# Patient Record
Sex: MALE | URBAN - METROPOLITAN AREA
[De-identification: names, ages, dates, MRNs, and addresses within clinical notes are randomized per-mention and may not be internally consistent; named-entity substitution may affect disease eponyms.]

---

## 2020-06-30 ENCOUNTER — APPOINTMENT (RX ONLY)
Dept: URBAN - METROPOLITAN AREA CLINIC 116 | Facility: CLINIC | Age: 60
Setting detail: DERMATOLOGY
End: 2020-06-30

## 2020-06-30 DIAGNOSIS — L28.1 PRURIGO NODULARIS: ICD-10-CM

## 2020-06-30 DIAGNOSIS — L29.89 OTHER PRURITUS: ICD-10-CM

## 2020-06-30 PROBLEM — L29.8 OTHER PRURITUS: Status: ACTIVE | Noted: 2020-06-30

## 2020-06-30 PROCEDURE — 17110 DESTRUCTION B9 LES UP TO 14: CPT

## 2020-06-30 PROCEDURE — ? LIQUID NITROGEN

## 2020-06-30 PROCEDURE — ? COUNSELING

## 2020-06-30 PROCEDURE — ? PRESCRIPTION

## 2020-06-30 PROCEDURE — 99202 OFFICE O/P NEW SF 15 MIN: CPT | Mod: 25

## 2020-06-30 RX ORDER — CLOBETASOL PROPIONATE 0.5 MG/G
1 OINTMENT TOPICAL TWICE A DAY
Qty: 1 | Refills: 1 | Status: ERX | COMMUNITY
Start: 2020-06-30

## 2020-06-30 RX ADMIN — CLOBETASOL PROPIONATE 1: 0.5 OINTMENT TOPICAL at 00:00

## 2020-06-30 ASSESSMENT — LOCATION ZONE DERM
LOCATION ZONE: TRUNK
LOCATION ZONE: ARM

## 2020-06-30 ASSESSMENT — LOCATION DETAILED DESCRIPTION DERM
LOCATION DETAILED: EPIGASTRIC SKIN
LOCATION DETAILED: RIGHT PROXIMAL POSTERIOR UPPER ARM
LOCATION DETAILED: RIGHT PROXIMAL LATERAL POSTERIOR UPPER ARM

## 2020-06-30 ASSESSMENT — LOCATION SIMPLE DESCRIPTION DERM
LOCATION SIMPLE: ABDOMEN
LOCATION SIMPLE: RIGHT UPPER ARM

## 2020-06-30 NOTE — PROCEDURE: MIPS QUALITY
Additional Notes: Pt is on no medications
Detail Level: Detailed
Quality 130: Documentation Of Current Medications In The Medical Record: Eligible clinician attests to documenting in the medical record the patient is not eligible for a current list of medications being obtained, updated, or reviewed by the eligible clinician

## 2020-06-30 NOTE — HPI: SKIN LESIONS
Have Your Skin Lesions Been Treated?: not been treated
Is This A New Presentation, Or A Follow-Up?: Skin Lesions
How Severe Is Your Skin Lesion?: mild
Additional History: Pt has used cortisone but it did not work that well.

## 2023-05-15 PROBLEM — 197321007: Status: ACTIVE | Noted: 2023-05-15

## 2023-05-17 ENCOUNTER — OFFICE VISIT (OUTPATIENT)
Dept: URBAN - METROPOLITAN AREA CLINIC 63 | Facility: CLINIC | Age: 63
End: 2023-05-17

## 2024-05-10 ENCOUNTER — DASHBOARD ENCOUNTERS (OUTPATIENT)
Age: 64
End: 2024-05-10

## 2024-05-13 ENCOUNTER — OFFICE VISIT (OUTPATIENT)
Dept: URBAN - METROPOLITAN AREA CLINIC 66 | Facility: CLINIC | Age: 64
End: 2024-05-13

## 2024-07-29 ENCOUNTER — OFFICE VISIT (OUTPATIENT)
Dept: URBAN - METROPOLITAN AREA CLINIC 66 | Facility: CLINIC | Age: 64
End: 2024-07-29
Payer: MEDICARE

## 2024-07-29 VITALS
HEIGHT: 75 IN | SYSTOLIC BLOOD PRESSURE: 140 MMHG | BODY MASS INDEX: 39.17 KG/M2 | DIASTOLIC BLOOD PRESSURE: 100 MMHG | WEIGHT: 315 LBS

## 2024-07-29 DIAGNOSIS — K59.00 ACUTE CONSTIPATION: ICD-10-CM

## 2024-07-29 DIAGNOSIS — I25.10 CORONARY ARTERY DISEASE INVOLVING NATIVE HEART, UNSPECIFIED VESSEL OR LESION TYPE, UNSPECIFIED WHETHER ANGINA PRESENT: ICD-10-CM

## 2024-07-29 DIAGNOSIS — R19.4 CHANGE IN BOWEL HABITS: ICD-10-CM

## 2024-07-29 DIAGNOSIS — Z79.01 CHRONIC ANTICOAGULATION: ICD-10-CM

## 2024-07-29 PROBLEM — 88111009: Status: ACTIVE | Noted: 2024-07-29

## 2024-07-29 PROBLEM — 53741008: Status: ACTIVE | Noted: 2024-07-29

## 2024-07-29 PROBLEM — 197119006: Status: ACTIVE | Noted: 2024-07-29

## 2024-07-29 PROBLEM — 711150003: Status: ACTIVE | Noted: 2024-07-29

## 2024-07-29 PROCEDURE — 99204 OFFICE O/P NEW MOD 45 MIN: CPT | Performed by: INTERNAL MEDICINE

## 2024-07-29 RX ORDER — CARVEDILOL 6.25 MG/1
TABLET, FILM COATED ORAL
Qty: 180 TABLET | Status: ACTIVE | COMMUNITY

## 2024-07-29 RX ORDER — APIXABAN 5 MG/1
TABLET, FILM COATED ORAL
Qty: 180 TABLET | Status: ACTIVE | COMMUNITY

## 2024-07-29 RX ORDER — TAMSULOSIN HYDROCHLORIDE 0.4 MG/1
TAKE 1 CAPSULE BY MOUTH ONCE DAILY AT BEDTIME CAPSULE ORAL
Qty: 90 EACH | Refills: 0 | Status: ACTIVE | COMMUNITY

## 2024-07-29 RX ORDER — FLUTICASONE PROPIONATE AND SALMETEROL 500; 50 UG/1; UG/1
INHALE 1 DOSE BY MOUTH TWICE DAILY AS DIRECTED POWDER RESPIRATORY (INHALATION)
Qty: 60 EACH | Refills: 3 | Status: ACTIVE | COMMUNITY

## 2024-07-29 RX ORDER — POTASSIUM CHLORIDE 750 MG/1
TABLET, FILM COATED, EXTENDED RELEASE ORAL
Qty: 90 TABLET | Status: ACTIVE | COMMUNITY

## 2024-07-29 RX ORDER — FINASTERIDE 5 MG/1
TABLET, FILM COATED ORAL
Qty: 90 TABLET | Status: ACTIVE | COMMUNITY

## 2024-07-29 RX ORDER — TORSEMIDE 20 MG/1
TABLET ORAL
Qty: 90 TABLET | Status: ACTIVE | COMMUNITY

## 2024-07-29 NOTE — HPI-TODAY'S VISIT:
Case of a 63-year-old male patient that comes in today for evaluation.  Patient refers that for the past several months he has experienced a change in bowel habits.  He refers worsening constipation.  He has to strain to have bowel movements.  Associated symptoms are abdominal distention.  Denies melena hematochezia hematemesis coffee-ground emesis.  He has never undergone a colonoscopy.

## 2024-09-23 ENCOUNTER — LAB OUTSIDE AN ENCOUNTER (OUTPATIENT)
Dept: URBAN - METROPOLITAN AREA CLINIC 66 | Facility: CLINIC | Age: 64
End: 2024-09-23

## 2024-09-23 ENCOUNTER — OFFICE VISIT (OUTPATIENT)
Dept: URBAN - METROPOLITAN AREA CLINIC 66 | Facility: CLINIC | Age: 64
End: 2024-09-23
Payer: MEDICARE

## 2024-09-23 VITALS
WEIGHT: 315 LBS | DIASTOLIC BLOOD PRESSURE: 90 MMHG | BODY MASS INDEX: 39.17 KG/M2 | SYSTOLIC BLOOD PRESSURE: 170 MMHG | HEART RATE: 78 BPM | OXYGEN SATURATION: 99 % | HEIGHT: 75 IN

## 2024-09-23 DIAGNOSIS — I25.10 CORONARY ARTERY DISEASE INVOLVING NATIVE HEART, UNSPECIFIED VESSEL OR LESION TYPE, UNSPECIFIED WHETHER ANGINA PRESENT: ICD-10-CM

## 2024-09-23 DIAGNOSIS — K59.00 ACUTE CONSTIPATION: ICD-10-CM

## 2024-09-23 DIAGNOSIS — Z79.01 CHRONIC ANTICOAGULATION: ICD-10-CM

## 2024-09-23 DIAGNOSIS — R19.4 CHANGE IN BOWEL HABITS: ICD-10-CM

## 2024-09-23 PROCEDURE — 99214 OFFICE O/P EST MOD 30 MIN: CPT | Performed by: INTERNAL MEDICINE

## 2024-09-23 RX ORDER — TORSEMIDE 20 MG/1
TABLET ORAL
Qty: 90 TABLET | Status: ACTIVE | COMMUNITY

## 2024-09-23 RX ORDER — FINASTERIDE 5 MG/1
TABLET, FILM COATED ORAL
Qty: 90 TABLET | Status: ACTIVE | COMMUNITY

## 2024-09-23 RX ORDER — FLUTICASONE PROPIONATE AND SALMETEROL 500; 50 UG/1; UG/1
INHALE 1 DOSE BY MOUTH TWICE DAILY AS DIRECTED POWDER RESPIRATORY (INHALATION)
Qty: 60 EACH | Refills: 3 | Status: ACTIVE | COMMUNITY

## 2024-09-23 RX ORDER — POTASSIUM CHLORIDE 750 MG/1
TABLET, FILM COATED, EXTENDED RELEASE ORAL
Qty: 90 TABLET | Status: ACTIVE | COMMUNITY

## 2024-09-23 RX ORDER — SOD SULF/POT CHLORIDE/MAG SULF 1.479 G
12 TABLETS TABLET ORAL
Qty: 24 | Refills: 0 | OUTPATIENT
Start: 2024-09-23 | End: 2024-09-24

## 2024-09-23 RX ORDER — APIXABAN 5 MG/1
TABLET, FILM COATED ORAL
Qty: 180 TABLET | Status: ACTIVE | COMMUNITY

## 2024-09-23 RX ORDER — TAMSULOSIN HYDROCHLORIDE 0.4 MG/1
TAKE 1 CAPSULE BY MOUTH ONCE DAILY AT BEDTIME CAPSULE ORAL
Qty: 90 EACH | Refills: 0 | Status: ACTIVE | COMMUNITY

## 2024-09-23 RX ORDER — CARVEDILOL 6.25 MG/1
TABLET, FILM COATED ORAL
Qty: 180 TABLET | Status: ACTIVE | COMMUNITY

## 2024-09-23 NOTE — HPI-TODAY'S VISIT:
Case of a 63-year-old male patient that comes in today for follow up of constipation.  Patient refers that for the past several months he has experienced a change in bowel habits.  He refers worsening constipation.  He has to strain to have bowel movements.  Associated symptoms are abdominal distention. On the last appointment he was started on miralax for symptom management. He was also referred to a cardiologist, due to his extensive cardiac Hx, to procure a cardiac risk stratification. He comes in today for follow up. Patient refers that he continues with bouts of constipation.  Currently managing with MiraLAX with limited response. He has seen cardiologist and cardiac restratification has been procured.   Denies melena hematochezia hematemesis coffee-ground emesis.

## 2024-09-30 ENCOUNTER — LAB OUTSIDE AN ENCOUNTER (OUTPATIENT)
Dept: URBAN - METROPOLITAN AREA CLINIC 66 | Facility: CLINIC | Age: 64
End: 2024-09-30

## 2024-10-14 ENCOUNTER — TELEPHONE ENCOUNTER (OUTPATIENT)
Dept: URBAN - METROPOLITAN AREA CLINIC 68 | Facility: CLINIC | Age: 64
End: 2024-10-14

## 2024-10-14 ENCOUNTER — OFFICE VISIT (OUTPATIENT)
Dept: URBAN - METROPOLITAN AREA MEDICAL CENTER 21 | Facility: MEDICAL CENTER | Age: 64
End: 2024-10-14
Payer: COMMERCIAL

## 2024-10-14 DIAGNOSIS — K64.8 OTHER HEMORRHOIDS: ICD-10-CM

## 2024-10-14 DIAGNOSIS — D12.4 BENIGN NEOPLASM OF DESCENDING COLON: ICD-10-CM

## 2024-10-14 DIAGNOSIS — R19.4 CHANGE IN BOWEL HABITS: ICD-10-CM

## 2024-10-14 PROCEDURE — 45381 COLONOSCOPY SUBMUCOUS NJX: CPT | Performed by: INTERNAL MEDICINE

## 2024-10-14 PROCEDURE — 45380 COLONOSCOPY AND BIOPSY: CPT | Performed by: INTERNAL MEDICINE

## 2024-10-14 RX ORDER — FLUTICASONE PROPIONATE AND SALMETEROL 500; 50 UG/1; UG/1
INHALE 1 DOSE BY MOUTH TWICE DAILY AS DIRECTED POWDER RESPIRATORY (INHALATION)
Qty: 60 EACH | Refills: 3 | Status: ACTIVE | COMMUNITY

## 2024-10-14 RX ORDER — FINASTERIDE 5 MG/1
TABLET, FILM COATED ORAL
Qty: 90 TABLET | Status: ACTIVE | COMMUNITY

## 2024-10-14 RX ORDER — CARVEDILOL 6.25 MG/1
TABLET, FILM COATED ORAL
Qty: 180 TABLET | Status: ACTIVE | COMMUNITY

## 2024-10-14 RX ORDER — TORSEMIDE 20 MG/1
TABLET ORAL
Qty: 90 TABLET | Status: ACTIVE | COMMUNITY

## 2024-10-14 RX ORDER — APIXABAN 5 MG/1
TABLET, FILM COATED ORAL
Qty: 180 TABLET | Status: ACTIVE | COMMUNITY

## 2024-10-14 RX ORDER — TAMSULOSIN HYDROCHLORIDE 0.4 MG/1
TAKE 1 CAPSULE BY MOUTH ONCE DAILY AT BEDTIME CAPSULE ORAL
Qty: 90 EACH | Refills: 0 | Status: ACTIVE | COMMUNITY

## 2024-10-14 RX ORDER — POTASSIUM CHLORIDE 750 MG/1
TABLET, FILM COATED, EXTENDED RELEASE ORAL
Qty: 90 TABLET | Status: ACTIVE | COMMUNITY

## 2024-10-25 ENCOUNTER — TELEPHONE ENCOUNTER (OUTPATIENT)
Dept: URBAN - METROPOLITAN AREA CLINIC 68 | Facility: CLINIC | Age: 64
End: 2024-10-25

## 2024-10-28 ENCOUNTER — LAB OUTSIDE AN ENCOUNTER (OUTPATIENT)
Dept: URBAN - METROPOLITAN AREA CLINIC 66 | Facility: CLINIC | Age: 64
End: 2024-10-28

## 2024-10-28 ENCOUNTER — OFFICE VISIT (OUTPATIENT)
Dept: URBAN - METROPOLITAN AREA CLINIC 66 | Facility: CLINIC | Age: 64
End: 2024-10-28
Payer: COMMERCIAL

## 2024-10-28 VITALS
SYSTOLIC BLOOD PRESSURE: 160 MMHG | BODY MASS INDEX: 39.17 KG/M2 | OXYGEN SATURATION: 96 % | HEART RATE: 78 BPM | WEIGHT: 315 LBS | HEIGHT: 75 IN | DIASTOLIC BLOOD PRESSURE: 84 MMHG

## 2024-10-28 DIAGNOSIS — K63.89 MASS OF COLON: ICD-10-CM

## 2024-10-28 PROCEDURE — 99214 OFFICE O/P EST MOD 30 MIN: CPT | Performed by: INTERNAL MEDICINE

## 2024-10-28 RX ORDER — POTASSIUM CHLORIDE 750 MG/1
TABLET, FILM COATED, EXTENDED RELEASE ORAL
Qty: 90 TABLET | Status: ACTIVE | COMMUNITY

## 2024-10-28 RX ORDER — TAMSULOSIN HYDROCHLORIDE 0.4 MG/1
TAKE 1 CAPSULE BY MOUTH ONCE DAILY AT BEDTIME CAPSULE ORAL
Qty: 90 EACH | Refills: 0 | Status: ACTIVE | COMMUNITY

## 2024-10-28 RX ORDER — APIXABAN 5 MG/1
TABLET, FILM COATED ORAL
Qty: 180 TABLET | Status: ACTIVE | COMMUNITY

## 2024-10-28 RX ORDER — TORSEMIDE 20 MG/1
TABLET ORAL
Qty: 90 TABLET | Status: ACTIVE | COMMUNITY

## 2024-10-28 RX ORDER — FLUTICASONE PROPIONATE AND SALMETEROL 500; 50 UG/1; UG/1
INHALE 1 DOSE BY MOUTH TWICE DAILY AS DIRECTED POWDER RESPIRATORY (INHALATION)
Qty: 60 EACH | Refills: 3 | Status: ACTIVE | COMMUNITY

## 2024-10-28 RX ORDER — CARVEDILOL 6.25 MG/1
TABLET, FILM COATED ORAL
Qty: 180 TABLET | Status: ACTIVE | COMMUNITY

## 2024-10-28 RX ORDER — FINASTERIDE 5 MG/1
TABLET, FILM COATED ORAL
Qty: 90 TABLET | Status: ACTIVE | COMMUNITY

## 2024-10-28 NOTE — HPI-TODAY'S VISIT:
Case of a 63-year-old male patient that comes in today for follow up. He was seen recently due to changes in bowel habits constipation. He underwent colonoscopy for evalution remarkable for an obstructive lesion in the proximal descending colon. Path was remarkable for a TA but endoscopically lesion looks malignant. He comes in today for follow up.He refers that he recently underwent CAT scan by his primary care physician.  He refers that he still having constipation.

## 2024-10-30 PROBLEM — 425711007: Status: ACTIVE | Noted: 2024-10-28

## 2024-11-25 ENCOUNTER — OFFICE VISIT (OUTPATIENT)
Dept: URBAN - METROPOLITAN AREA CLINIC 66 | Facility: CLINIC | Age: 64
End: 2024-11-25
Payer: COMMERCIAL

## 2024-11-25 VITALS — BODY MASS INDEX: 39.17 KG/M2 | WEIGHT: 315 LBS | HEIGHT: 75 IN

## 2024-11-25 DIAGNOSIS — K59.09 CHRONIC CONSTIPATION: ICD-10-CM

## 2024-11-25 DIAGNOSIS — K63.89 MASS OF COLON: ICD-10-CM

## 2024-11-25 PROBLEM — 236069009: Status: ACTIVE | Noted: 2024-11-25

## 2024-11-25 PROCEDURE — 99214 OFFICE O/P EST MOD 30 MIN: CPT | Performed by: INTERNAL MEDICINE

## 2024-11-25 RX ORDER — FLUTICASONE PROPIONATE AND SALMETEROL 500; 50 UG/1; UG/1
INHALE 1 DOSE BY MOUTH TWICE DAILY AS DIRECTED POWDER RESPIRATORY (INHALATION)
Qty: 60 EACH | Refills: 3 | Status: ACTIVE | COMMUNITY

## 2024-11-25 RX ORDER — APIXABAN 5 MG/1
TABLET, FILM COATED ORAL
Qty: 180 TABLET | Status: ACTIVE | COMMUNITY

## 2024-11-25 RX ORDER — POTASSIUM CHLORIDE 750 MG/1
TABLET, FILM COATED, EXTENDED RELEASE ORAL
Qty: 90 TABLET | Status: ACTIVE | COMMUNITY

## 2024-11-25 RX ORDER — CARVEDILOL 6.25 MG/1
TABLET, FILM COATED ORAL
Qty: 180 TABLET | Status: ACTIVE | COMMUNITY

## 2024-11-25 RX ORDER — LACTULOSE 10 G/15ML
15 ML AS NEEDED SOLUTION ORAL EVERY 12 HOURS
Qty: 2700 ML | OUTPATIENT
Start: 2024-11-25 | End: 2025-02-23

## 2024-11-25 RX ORDER — TORSEMIDE 20 MG/1
TABLET ORAL
Qty: 90 TABLET | Status: ACTIVE | COMMUNITY

## 2024-11-25 RX ORDER — TAMSULOSIN HYDROCHLORIDE 0.4 MG/1
TAKE 1 CAPSULE BY MOUTH ONCE DAILY AT BEDTIME CAPSULE ORAL
Qty: 90 EACH | Refills: 0 | Status: ACTIVE | COMMUNITY

## 2024-11-25 RX ORDER — FINASTERIDE 5 MG/1
TABLET, FILM COATED ORAL
Qty: 90 TABLET | Status: ACTIVE | COMMUNITY

## 2024-11-25 NOTE — HPI-TODAY'S VISIT:
Case of a 63-year-old male patient that comes in today for follow up. He was seen recently due to changes in bowel habits constipation. He underwent colonoscopy for evalution remarkable for an obstructive lesion in the proximal descending colon. Path was remarkable for a TA but endoscopically lesion looks malignant. He comes in today for follow up.He refers he already saw Dr. Leary.  Planning for surgery in terms of timing ongoing.  Timing for surgery indeterminate at this point.

## 2025-01-27 ENCOUNTER — OFFICE VISIT (OUTPATIENT)
Dept: URBAN - METROPOLITAN AREA CLINIC 66 | Facility: CLINIC | Age: 65
End: 2025-01-27

## 2025-01-27 RX ORDER — FLUTICASONE PROPIONATE AND SALMETEROL 500; 50 UG/1; UG/1
INHALE 1 DOSE BY MOUTH TWICE DAILY AS DIRECTED POWDER RESPIRATORY (INHALATION)
Qty: 60 EACH | Refills: 3 | COMMUNITY

## 2025-01-27 RX ORDER — TORSEMIDE 20 MG/1
TABLET ORAL
Qty: 90 TABLET | COMMUNITY

## 2025-01-27 RX ORDER — LACTULOSE 10 G/15ML
15 ML AS NEEDED SOLUTION ORAL EVERY 12 HOURS
Qty: 2700 ML | COMMUNITY
Start: 2024-11-25 | End: 2025-02-23

## 2025-01-27 RX ORDER — TAMSULOSIN HYDROCHLORIDE 0.4 MG/1
TAKE 1 CAPSULE BY MOUTH ONCE DAILY AT BEDTIME CAPSULE ORAL
Qty: 90 EACH | Refills: 0 | COMMUNITY

## 2025-01-27 RX ORDER — FINASTERIDE 5 MG/1
TABLET, FILM COATED ORAL
Qty: 90 TABLET | COMMUNITY

## 2025-01-27 RX ORDER — APIXABAN 5 MG/1
TABLET, FILM COATED ORAL
Qty: 180 TABLET | COMMUNITY

## 2025-01-27 RX ORDER — CARVEDILOL 6.25 MG/1
TABLET, FILM COATED ORAL
Qty: 180 TABLET | COMMUNITY

## 2025-01-27 RX ORDER — POTASSIUM CHLORIDE 750 MG/1
TABLET, FILM COATED, EXTENDED RELEASE ORAL
Qty: 90 TABLET | COMMUNITY

## 2025-01-27 NOTE — HPI-TODAY'S VISIT:
Case of a 63-year-old male patient that comes in today for follow up. He was seen recently due to changes in bowel habits constipation. He underwent colonoscopy for evalution remarkable for an obstructive lesion in the proximal descending colon. Path was remarkable for a TA but endoscopically lesion looks malignant. He underwent Sx evaluation and Sx planning was ongoing.

## 2025-08-11 ENCOUNTER — OFFICE VISIT (OUTPATIENT)
Dept: URBAN - METROPOLITAN AREA CLINIC 66 | Facility: CLINIC | Age: 65
End: 2025-08-11

## 2025-08-20 ENCOUNTER — LAB OUTSIDE AN ENCOUNTER (OUTPATIENT)
Dept: URBAN - METROPOLITAN AREA CLINIC 68 | Facility: CLINIC | Age: 65
End: 2025-08-20

## 2025-08-20 ENCOUNTER — TELEPHONE ENCOUNTER (OUTPATIENT)
Dept: URBAN - METROPOLITAN AREA CLINIC 68 | Facility: CLINIC | Age: 65
End: 2025-08-20